# Patient Record
Sex: MALE | Race: WHITE | Employment: FULL TIME | URBAN - METROPOLITAN AREA
[De-identification: names, ages, dates, MRNs, and addresses within clinical notes are randomized per-mention and may not be internally consistent; named-entity substitution may affect disease eponyms.]

---

## 2019-05-07 ENCOUNTER — HOSPITAL ENCOUNTER (OUTPATIENT)
Dept: ULTRASOUND IMAGING | Age: 59
Discharge: HOME | End: 2019-05-07
Payer: COMMERCIAL

## 2019-05-07 DIAGNOSIS — N50.819 TESTICLE PAIN: ICD-10-CM

## 2019-05-07 PROCEDURE — 76870 US EXAM SCROTUM: CPT

## 2021-01-25 ENCOUNTER — HOSPITAL ENCOUNTER (OUTPATIENT)
Dept: ULTRASOUND IMAGING | Age: 61
Discharge: HOME | End: 2021-01-25
Payer: COMMERCIAL

## 2021-01-25 DIAGNOSIS — N43.40 SPERMATOCELE OF EPIDIDYMIS: ICD-10-CM

## 2021-01-25 PROCEDURE — 76870 US EXAM SCROTUM: CPT

## 2024-08-05 ENCOUNTER — OFFICE VISIT (OUTPATIENT)
Age: 64
End: 2024-08-05

## 2024-08-05 VITALS
HEART RATE: 65 BPM | WEIGHT: 157.8 LBS | OXYGEN SATURATION: 93 % | SYSTOLIC BLOOD PRESSURE: 125 MMHG | TEMPERATURE: 98 F | DIASTOLIC BLOOD PRESSURE: 77 MMHG

## 2024-08-05 DIAGNOSIS — R10.84 GENERALIZED ABDOMINAL PAIN: Primary | ICD-10-CM

## 2024-08-05 RX ORDER — RISPERIDONE 0.5 MG/1
1.5 TABLET, ORALLY DISINTEGRATING ORAL 2 TIMES DAILY
COMMUNITY
Start: 2019-05-01

## 2024-08-05 RX ORDER — ASPIRIN 81 MG/1
81 TABLET ORAL DAILY
COMMUNITY

## 2024-08-05 RX ORDER — TRAZODONE HYDROCHLORIDE 50 MG/1
50 TABLET ORAL NIGHTLY
COMMUNITY

## 2024-08-05 RX ORDER — LORAZEPAM 1 MG/1
1 TABLET ORAL 2 TIMES DAILY
COMMUNITY

## 2024-08-05 ASSESSMENT — ENCOUNTER SYMPTOMS
ANAL BLEEDING: 0
FLATUS: 0
NAUSEA: 1
VOMITING: 0
CONSTIPATION: 0
DIARRHEA: 0
BLOOD IN STOOL: 0
ABDOMINAL DISTENTION: 0
RESPIRATORY NEGATIVE: 1
ABDOMINAL PAIN: 1
BELCHING: 0
RECTAL PAIN: 0

## 2024-08-05 NOTE — PROGRESS NOTES
[facility]RGENT [facility], [facility] ([place])  [facility] URGENT [facility]  [address] [facility] [address]  Dept: [phone]  Dept Fax: [phone]  Loc: [phone]  Gee Sprague is a 64 y.o. male who presents today for his medical conditions/complaintsas noted below.  Gee Sprague is c/o of Abdominal Pain (Patient complains of abdominal pain that started last night.  Patient was seen at [place] ED this morning.  Patient believes he drank sour milk.   Patient was not sent home with any medication but was administered medications while in the ER, Zofran, Ketorolac, morphine, and methocarbamol.  Was advised to purchase over the counter gas x.  )      HPI:     Pt here with wife. Report severe generalized abdominal pain. He was in the ER earlier today for same problem. Was treated and release. He reports all testing was negative.     Pt crawling on floor from pain.       Abdominal Pain  This is a new problem. The current episode started yesterday. The onset quality is sudden. The problem occurs constantly. The most recent episode lasted 1 day. The problem has been rapidly worsening. The pain is located in the generalized abdominal region. The pain is at a severity of 10/10. The pain is severe. The quality of the pain is cramping and a sensation of fullness. Associated symptoms include nausea. Pertinent negatives include no belching, constipation, diarrhea, fever, flatus, frequency, headaches, hematuria, melena, myalgias or vomiting. The pain is aggravated by movement. The pain is relieved by Nothing. Treatments tried: Zofran, Ketorolac, morphine, and methocarbamol. Improvement on treatment: Temporal relieve only.       History reviewed. No pertinent past medical history.   History reviewed. No pertinent surgical history.    History reviewed. No pertinent family history.    Social History     Tobacco Use    Smoking status: Never    Smokeless tobacco: Never   Substance Use Topics    Alcohol use: Not on  Report received from JAROCHO Taylor.

## 2024-08-06 ENCOUNTER — TELEPHONE (OUTPATIENT)
Age: 64
End: 2024-08-06

## 2024-08-07 NOTE — TELEPHONE ENCOUNTER
Call made to patient to check on progress. Spoke to wife. She reports pt was treated and released last night with an appointment to see gastroenterologist tomorrow. Wife reports pt has had significant improvement with treatment he was sent home with from ER. She verbalized appreciation for the call